# Patient Record
Sex: MALE | Race: WHITE | NOT HISPANIC OR LATINO | Employment: FULL TIME | URBAN - METROPOLITAN AREA
[De-identification: names, ages, dates, MRNs, and addresses within clinical notes are randomized per-mention and may not be internally consistent; named-entity substitution may affect disease eponyms.]

---

## 2018-02-20 ENCOUNTER — OFFICE VISIT CONVERTED (OUTPATIENT)
Dept: FAMILY MEDICINE CLINIC | Facility: CLINIC | Age: 23
End: 2018-02-20
Attending: FAMILY MEDICINE

## 2018-02-20 ENCOUNTER — CONVERSION ENCOUNTER (OUTPATIENT)
Dept: FAMILY MEDICINE CLINIC | Facility: CLINIC | Age: 23
End: 2018-02-20

## 2019-01-24 ENCOUNTER — HOSPITAL ENCOUNTER (OUTPATIENT)
Dept: FAMILY MEDICINE CLINIC | Facility: CLINIC | Age: 24
Discharge: HOME | End: 2019-01-24
Attending: FAMILY MEDICINE

## 2019-01-24 ENCOUNTER — CONVERSION ENCOUNTER (OUTPATIENT)
Dept: FAMILY MEDICINE CLINIC | Facility: CLINIC | Age: 24
End: 2019-01-24

## 2019-01-24 ENCOUNTER — OFFICE VISIT CONVERTED (OUTPATIENT)
Dept: FAMILY MEDICINE CLINIC | Facility: CLINIC | Age: 24
End: 2019-01-24
Attending: FAMILY MEDICINE

## 2019-01-24 LAB
C TRACH RRNA CVX QL NAA+PROBE: NOT DETECTED
N GONORRHOEA DNA SPEC QL NAA+PROBE: NOT DETECTED

## 2020-06-11 ENCOUNTER — OFFICE VISIT CONVERTED (OUTPATIENT)
Dept: FAMILY MEDICINE CLINIC | Facility: CLINIC | Age: 25
End: 2020-06-11
Attending: FAMILY MEDICINE

## 2020-12-17 ENCOUNTER — TELEMEDICINE CONVERTED (OUTPATIENT)
Dept: FAMILY MEDICINE CLINIC | Facility: CLINIC | Age: 25
End: 2020-12-17
Attending: FAMILY MEDICINE

## 2021-04-09 ENCOUNTER — HOSPITAL ENCOUNTER (OUTPATIENT)
Dept: URGENT CARE | Facility: CLINIC | Age: 26
Discharge: HOME | End: 2021-04-09
Attending: EMERGENCY MEDICINE

## 2021-04-09 LAB — SARS-COV-2 RNA SPEC QL NAA+PROBE: NOT DETECTED

## 2021-04-11 LAB — BACTERIA SPEC AEROBE CULT: NORMAL

## 2021-05-13 NOTE — PROGRESS NOTES
Progress Note      Patient Name: Jose Aquino   Patient ID: [id number]   Sex: Male   YOB: 1995    Primary Care Provider: Ko Shah DO    Visit Date: June 11, 2020    Provider: Ko Shah DO   Location: [facility]   Location Address: [address] [place] [address]  [id number]   Location Phone: [phone]          Chief Complaint  · Follow up - Insomnia  · medication refill      History Of Present Illness  Jose Aquino is a 24 year old /White male who presents for evaluation and treatment of: chronic insomnia. He has continued issues sleeping. He lays awake until 3-4 am. No daytime naps. He does caffeine early morning only. He limits his night time screen time.       Past Medical History  Disease Name Date Onset Notes   Concussion --  --    Knee: ACL --  --    MCL Sprain 04/14/2015 --    Sleep disorder, circadian 01/19/2017 --    Syncope and collapse 07/14/2017 --          Past Surgical History  Procedure Name Date Notes   Arthroscopic Knee Surgery 12/27/2013 and 07/28/2014 LEFT knee ACL recons         Medication List  Name Date Started Instructions   zolpidem 10 mg oral tablet  take 1 tablet (10 mg) by oral route once daily at bedtime         Allergy List  Allergen Name Date Reaction Notes   NO KNOWN DRUG ALLERGIES --  --  --          Family Medical History  Disease Name Relative/Age Notes   Hypertension Grandfather (maternal)/  Grandmother (maternal)/   --    *Unremarkable  --    Diabetes Grandfather (maternal)/  Grandmother (maternal)/   --          Social History  Finding Status Start/Stop Quantity Notes   Alcohol Current some day --/-- --  01/24/2019 -    Exercises regularly --  --/-- --  --    Tobacco Current some day --/-- --  vape         Review of Systems  · Constitutional  o Denies  o : fatigue  · Psychiatric  o Admits  o : difficulty sleeping      Vitals  Date Time BP Position Site L\R Cuff Size HR RR TEMP (F) WT  HT  BMI  "kg/m2 BSA m2 O2 Sat        02/20/2018 11:16 /74 Sitting    57 - R   329lbs 0oz 5'  11\" 45.89 2.73     01/24/2019 12:10 /68 Sitting    63 - R  99.2 313lbs 2oz 5'  11\" 43.67 2.67 100 %    06/11/2020 02:18 /78 Sitting    81 - R  98.5 219lbs 2oz 5'  11\" 30.56 2.23 97 %          Physical Examination  · Constitutional  o Appearance  o : well-nourished, well developed, no obvious deformities present  · Respiratory  o Respiratory Effort  o : breathing unlabored, no accessory muscle use  o Auscultation of Lungs  o : normal breath sounds  · Cardiovascular  o Heart  o :   § Auscultation of Heart  § : regular rate, normal rhythm, no murmurs present          Assessment  · Screening for depression     V79.0/Z13.89  · Sleep disorder, circadian     327.30/G47.20  Will continue the Zolpidem He will keep working on sleep hygiene      Plan  · Orders  o ACO-18: Positive screen for clinical depression using a standardized tool and a follow-up plan documented () - V79.0/Z13.89 - 06/11/2020  o ACO-39: Current medications updated and reviewed () - - 06/11/2020  · Medications  o zolpidem 10 mg oral tablet   SIG: take 1 tablet (10 mg) by oral route once daily at bedtime for 30 days   DISP: (30) tablet with 5 refills  Adjusted on 06/11/2020     o Medications have been Reconciled  o Transition of Care or Provider Policy  · Instructions  o Depression Screen completed and scanned into the EMR under the designated folder within the patient's documents.  o Today's PHQ-9 result is 8  o Patient is taking medications as prescribed and doing well.   o Take all medications as prescribed/directed.  o Patient instructed/educated on their diet and exercise program.  o Patient was educated/instructed on their diagnosis, treatment and medications prior to discharge from the clinic today.  o Patient instructed to seek medical attention urgently for new or worsening symptoms.  o Call the office with any concerns or " questions.  o Bring all medicines with their bottles to each office visit.  · Disposition  o Call or Return if symptoms worsen or persist.  o Return Visit Request in/on 6 months +/- 2 days ([id number]).            Electronically Signed by: Ko Shah DO -Author on June 11, 2020 03:14:46 PM

## 2021-05-14 NOTE — PROGRESS NOTES
Progress Note      Patient Name: Jose Aquino   Patient ID: [id number]   Sex: Male   YOB: 1995    Primary Care Provider: Ko Shah DO    Visit Date: December 17, 2020    Provider: Ko hSah DO   Location: [facility]   Location Address: [address] [place] [address]  [id number]   Location Phone: [phone]          Chief Complaint  · 6 month follow up - sleep disorder, cicadian   · pt is positive covid at home today       History Of Present Illness  Video Conferencing Visit  Jose Aquino is a 24 year old /White male who is presenting for evaluation via video conferencing via ZOOM. Verbal consent obtained before beginning visit.   The following staff were present during this visit: Angelica Mahan CMA   Jose Aquino is a 24 year old /White male who presents for evaluation and treatment of: covid. He was Dx a week ago with COVID. He had loss of smell. It has since resolved and currently feels fine.      Sleep disorder. He uses the Zolpidem 3-4 times per week. He typically gets about 5 hrs sleep per night.       Past Medical History  Disease Name Date Onset Notes   Concussion --  --    Knee: ACL --  --    MCL Sprain 04/14/2015 --    Sleep disorder, circadian 01/19/2017 --    Syncope and collapse 07/14/2017 --          Past Surgical History  Procedure Name Date Notes   Arthroscopic Knee Surgery 12/27/2013 and 07/28/2014 LEFT knee ACL recons         Medication List  Name Date Started Instructions   zolpidem 10 mg oral tablet 07/27/2020 TAKE 1 TABLET BY MOUTH EVERY DAY AT BEDTIME         Allergy List  Allergen Name Date Reaction Notes   NO KNOWN DRUG ALLERGIES --  --  --          Family Medical History  Disease Name Relative/Age Notes   Hypertension Grandfather (maternal)/  Grandmother (maternal)/   --    *Unremarkable  --    Diabetes Grandfather (maternal)/  Grandmother (maternal)/   --          Social  "History  Finding Status Start/Stop Quantity Notes   Alcohol Current some day --/-- --  01/24/2019 -    Exercises regularly --  --/-- --  --    Tobacco Current some day --/-- --  vape         Review of Systems  · Constitutional  o Denies  o : fatigue, fever, chills  · HENT  o Denies  o : headaches, nasal congestion, nasal discharge, postnasal drip  · Respiratory  o Denies  o : shortness of breath, wheezing, cough, dyspnea on exertion  · Gastrointestinal  o Denies  o : nausea, vomiting, diarrhea  · Psychiatric  o Admits  o : difficulty sleeping  o Denies  o : anxiety, depression      Vitals  Date Time BP Position Site L\R Cuff Size HR RR TEMP (F) WT  HT  BMI kg/m2 BSA m2 O2 Sat FR L/min FiO2 HC       02/20/2018 11:16 /74 Sitting    57 - R   329lbs 0oz 5'  11\" 45.89 2.73       01/24/2019 12:10 /68 Sitting    63 - R  99.2 313lbs 2oz 5'  11\" 43.67 2.67 100 %      06/11/2020 02:18 /78 Sitting    81 - R  98.5 219lbs 2oz 5'  11\" 30.56 2.23 97 %  21%          Physical Examination  · Constitutional  o Appearance  o : well-nourished, well developed, no obvious deformities present  · Head and Face  o Head  o :   § Inspection  § : atraumatic, normocephalic  o Face  o :   § Inspection  § : no facial lesions  · Respiratory  o Respiratory Effort  o : breathing unlabored, no accessory muscle use  · Psychiatric  o Judgement and Insight  o : judgement and insight intact  o Thought Processes  o : rate of thoughts normal, thought content logical, abstract reasoning within normal limits  o Mood and Affect  o : mood normal, affect appropriate          Assessment  · Sleep disorder, circadian     327.30/G47.20  stable  · COVID-19 virus detected       COVID-19     519.8/U07.1  his symptoms were short lived and have resolved.       Plan  · Orders  o ACO-39: Current medications updated and reviewed (1159F, ) - - 12/17/2020  · Medications  o zolpidem 10 mg oral tablet   SIG: take 1 tablet (10 mg) by oral route once daily " at bedtime for 30 days   DISP: (30) Tablet with 2 refills  Adjusted on 12/17/2020     o Medications have been Reconciled  o Transition of Care or Provider Policy  · Instructions  o Patient is taking medications as prescribed and doing well.   o Take all medications as prescribed/directed.  o Patient was educated/instructed on their diagnosis, treatment and medications prior to discharge from the clinic today.  o Patient instructed to seek medical attention urgently for new or worsening symptoms.  o Call the office with any concerns or questions.  o Bring all medicines with their bottles to each office visit.  · Disposition  o Call or Return if symptoms worsen or persist.  o Return Visit Request in/on 6 months +/- 2 days ([id number]).            Electronically Signed by: Ko Shah DO -Author on December 17, 2020 08:37:40 AM

## 2021-05-15 VITALS
HEART RATE: 81 BPM | OXYGEN SATURATION: 97 % | TEMPERATURE: 98.5 F | DIASTOLIC BLOOD PRESSURE: 78 MMHG | WEIGHT: 219.12 LBS | BODY MASS INDEX: 30.68 KG/M2 | HEIGHT: 71 IN | SYSTOLIC BLOOD PRESSURE: 136 MMHG

## 2021-05-16 VITALS
TEMPERATURE: 99.2 F | BODY MASS INDEX: 43.84 KG/M2 | WEIGHT: 313.12 LBS | DIASTOLIC BLOOD PRESSURE: 68 MMHG | HEART RATE: 63 BPM | HEIGHT: 71 IN | OXYGEN SATURATION: 100 % | SYSTOLIC BLOOD PRESSURE: 136 MMHG

## 2021-05-16 VITALS
BODY MASS INDEX: 44.1 KG/M2 | SYSTOLIC BLOOD PRESSURE: 129 MMHG | WEIGHT: 315 LBS | DIASTOLIC BLOOD PRESSURE: 74 MMHG | HEART RATE: 57 BPM | HEIGHT: 71 IN

## 2021-07-12 RX ORDER — ZOLPIDEM TARTRATE 10 MG/1
TABLET ORAL
Qty: 30 TABLET | OUTPATIENT
Start: 2021-07-12

## 2021-07-22 ENCOUNTER — OFFICE VISIT (OUTPATIENT)
Dept: FAMILY MEDICINE CLINIC | Facility: CLINIC | Age: 26
End: 2021-07-22

## 2021-07-22 VITALS
TEMPERATURE: 98.1 F | WEIGHT: 249 LBS | OXYGEN SATURATION: 99 % | BODY MASS INDEX: 34.86 KG/M2 | SYSTOLIC BLOOD PRESSURE: 117 MMHG | HEIGHT: 71 IN | DIASTOLIC BLOOD PRESSURE: 73 MMHG | HEART RATE: 66 BPM

## 2021-07-22 DIAGNOSIS — G47.20 SLEEP DISORDER, CIRCADIAN: Primary | ICD-10-CM

## 2021-07-22 LAB
AMPHET+METHAMPHET UR QL: POSITIVE
BARBITURATES UR QL SCN: NEGATIVE
BENZODIAZ UR QL SCN: NEGATIVE
CANNABINOIDS SERPL QL: POSITIVE
COCAINE UR QL: NEGATIVE
METHADONE UR QL SCN: NEGATIVE
OPIATES UR QL: NEGATIVE
OXYCODONE UR QL SCN: POSITIVE

## 2021-07-22 PROCEDURE — 80307 DRUG TEST PRSMV CHEM ANLYZR: CPT | Performed by: FAMILY MEDICINE

## 2021-07-22 PROCEDURE — 99213 OFFICE O/P EST LOW 20 MIN: CPT | Performed by: FAMILY MEDICINE

## 2021-07-22 RX ORDER — ZOLPIDEM TARTRATE 10 MG/1
1 TABLET ORAL DAILY
COMMUNITY
Start: 2021-04-24 | End: 2021-07-22 | Stop reason: SDUPTHER

## 2021-07-22 RX ORDER — ZOLPIDEM TARTRATE 10 MG/1
10 TABLET ORAL DAILY
Qty: 30 TABLET | Refills: 3 | Status: SHIPPED | OUTPATIENT
Start: 2021-07-22 | End: 2021-08-21

## 2021-07-22 NOTE — PROGRESS NOTES
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    Sleep disorder-his sleep cycle is still off.  He goes to bed at 7:30 PM to awaken at 3:30 AM.  Thus he uses a sleep aid to get the bed at an unusual time.  He feels only a little tired upon awakening but then that quickly resolves.      PHQ-2 Depression Screening  Little interest or pleasure in doing things? 0   Feeling down, depressed, or hopeless? 0   PHQ-2 Total Score 0   PHQ-9 Depression Screening  Little interest or pleasure in doing things? 0   Feeling down, depressed, or hopeless? 0   Trouble falling or staying asleep, or sleeping too much?     Feeling tired or having little energy?     Poor appetite or overeating?     Feeling bad about yourself - or that you are a failure or have let yourself or your family down?     Trouble concentrating on things, such as reading the newspaper or watching television?     Moving or speaking so slowly that other people could have noticed? Or the opposite - being so fidgety or restless that you have been moving around a lot more than usual?     Thoughts that you would be better off dead, or of hurting yourself in some way?     PHQ-9 Total Score 0   If you checked off any problems, how difficult have these problems made it for you to do your work, take care of things at home, or get along with other people?       No Known Allergies    Prior to Admission medications    Medication Sig Start Date End Date Taking? Authorizing Provider   zolpidem (AMBIEN) 10 MG tablet Take 1 tablet by mouth Daily. 4/24/21  Yes Provider, Jack, MD        Patient Active Problem List   Diagnosis   • Sleep disorder, circadian        Past Surgical  "History:   Procedure Laterality Date   • KNEE ARTHROSCOPY Left     12/27/13 and 7/28/14  ACL recons       Social History     Socioeconomic History   • Marital status: Single     Spouse name: Not on file   • Number of children: Not on file   • Years of education: Not on file   • Highest education level: Not on file   Tobacco Use   • Smoking status: Never Smoker   • Smokeless tobacco: Never Used   • Tobacco comment: current some day - vape   Vaping Use   • Vaping Use: Every day   • Substances: Nicotine, Flavoring   • Devices: Refillable tank   Substance and Sexual Activity   • Alcohol use: Yes     Comment: current some day -1/24/19   • Drug use: Never   • Sexual activity: Defer       Family History   Problem Relation Age of Onset   • Hypertension Maternal Grandmother    • Diabetes Maternal Grandmother    • Hypertension Maternal Grandfather    • Diabetes Maternal Grandfather        Family history, surgical history, past medical history, Allergies and med's reviewed with patient today and updated in [facility] EMR.     ROS:  Review of Systems   Constitutional: Negative for fatigue.   Respiratory: Negative for cough, chest tightness, shortness of breath and wheezing.    Cardiovascular: Negative for chest pain and palpitations.   Psychiatric/Behavioral: Negative for sleep disturbance.       OBJECTIVE:  Vitals:    07/22/21 1615   BP: 117/73   BP Location: Left arm   Patient Position: Sitting   Cuff Size: Large Adult   Pulse: 66   Temp: 98.1 °F (36.7 °C)   TempSrc: Temporal   SpO2: 99%   Weight: 113 kg (249 lb)   Height: 180.3 cm (71\")     No exam data present   Body mass index is 34.73 kg/m².  No LMP for male patient.    Physical Exam  Vitals and nursing note reviewed.   Constitutional:       General: He is not in acute distress.     Appearance: Normal appearance. He is obese.   HENT:      Head: Normocephalic and atraumatic.   Cardiovascular:      Rate and Rhythm: Normal rate and regular rhythm.      Pulses: Normal pulses.      " Heart sounds: Normal heart sounds. No murmur heard.     Pulmonary:      Effort: Pulmonary effort is normal.      Breath sounds: Normal breath sounds. No wheezing, rhonchi or rales.   Neurological:      Mental Status: He is alert.   Psychiatric:         Mood and Affect: Mood normal.         Procedures    No visits with results within 30 Day(s) from this visit.   Latest known visit with results is:   No results found for any previous visit.       ASSESSMENT/ PLAN:    Diagnoses and all orders for this visit:    1. Sleep disorder, circadian (Primary)  Assessment & Plan:  Due to his strange sleep-wake cycle he takes the zolpidem on an almost nightly basis.  He is not having any ill effects and allows him to get adequate rest to perform his job during the day.    Orders:  -     zolpidem (AMBIEN) 10 MG tablet; Take 1 tablet by mouth Daily for 30 days.  Dispense: 30 tablet; Refill: 3  -     Urine Drug Screen - Urine, Clean Catch      Orders Placed Today:     New Medications Ordered This Visit   Medications   • zolpidem (AMBIEN) 10 MG tablet     Sig: Take 1 tablet by mouth Daily for 30 days.     Dispense:  30 tablet     Refill:  3        Management Plan:     An After Visit Summary was printed and given to the patient at discharge.    Follow-up: No follow-ups on file.    Ko Shah,  7/22/2021 16:53 EDT  This note was electronically signed.

## 2021-07-22 NOTE — ASSESSMENT & PLAN NOTE
Due to his strange sleep-wake cycle he takes the zolpidem on an almost nightly basis.  He is not having any ill effects and allows him to get adequate rest to perform his job during the day.

## 2021-07-29 ENCOUNTER — TELEPHONE (OUTPATIENT)
Dept: FAMILY MEDICINE CLINIC | Facility: CLINIC | Age: 26
End: 2021-07-29

## 2021-07-29 DIAGNOSIS — R82.5 POSITIVE URINE DRUG SCREEN: Primary | ICD-10-CM

## 2021-08-09 NOTE — TELEPHONE ENCOUNTER
Notified patient, UDS conformation ordered, patient states he cannot come till Thursday but will be here Thursday morning.

## 2021-08-09 NOTE — TELEPHONE ENCOUNTER
I do not want this done as an in-house as it never picks up anything.  When to make sure that we send this out as a confirmation from the start.

## 2021-08-25 ENCOUNTER — OFFICE VISIT (OUTPATIENT)
Dept: FAMILY MEDICINE CLINIC | Facility: CLINIC | Age: 26
End: 2021-08-25

## 2021-08-25 VITALS
HEART RATE: 63 BPM | HEIGHT: 71 IN | DIASTOLIC BLOOD PRESSURE: 84 MMHG | WEIGHT: 246.5 LBS | BODY MASS INDEX: 34.51 KG/M2 | SYSTOLIC BLOOD PRESSURE: 137 MMHG | OXYGEN SATURATION: 99 %

## 2021-08-25 DIAGNOSIS — S29.019A STRAIN OF THORACIC SPINE, INITIAL ENCOUNTER: ICD-10-CM

## 2021-08-25 DIAGNOSIS — G47.20 SLEEP DISORDER, CIRCADIAN: Primary | ICD-10-CM

## 2021-08-25 PROCEDURE — 99214 OFFICE O/P EST MOD 30 MIN: CPT | Performed by: FAMILY MEDICINE

## 2021-08-25 RX ORDER — CYCLOBENZAPRINE HCL 5 MG
5 TABLET ORAL 3 TIMES DAILY PRN
Qty: 20 TABLET | Refills: 0 | Status: SHIPPED | OUTPATIENT
Start: 2021-08-25 | End: 2021-08-30

## 2021-08-25 RX ORDER — NAPROXEN 500 MG/1
500 TABLET ORAL 2 TIMES DAILY WITH MEALS
Qty: 60 TABLET | Refills: 1 | Status: SHIPPED | OUTPATIENT
Start: 2021-08-25 | End: 2021-09-24

## 2021-08-25 RX ORDER — ZOLPIDEM TARTRATE 5 MG/1
5 TABLET ORAL NIGHTLY PRN
COMMUNITY
End: 2022-01-05 | Stop reason: SDUPTHER

## 2021-08-25 NOTE — ASSESSMENT & PLAN NOTE
He takes the Ambien med as needed.  Typically when he has gone a couple days without sleep.  We will update his urine drug screen here today.

## 2021-08-25 NOTE — ASSESSMENT & PLAN NOTE
Would continue her routine course of anti-inflammatories we will add a short course of muscle relaxer and start some physical therapy.

## 2021-08-25 NOTE — PROGRESS NOTES
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    Back pain-he was originally seen in the emergency room in April of this year for mid to lower back pain.  He had x-rays at that time which were essentially normal.  He states he did get better at that time but in the last month it has gotten somewhat worse.  He states he is not aware of anything that makes it better or worse.  He states some days he will have several days in a row that he feels pretty good then followed by several days that it moderate severity on a daily basis.  There are no day-to-day activities that seem to aggravate it.  He was tried multiple over-the-counter anti-inflammatories with some benefit.      PHQ-2 Depression Screening  Little interest or pleasure in doing things?     Feeling down, depressed, or hopeless?     PHQ-2 Total Score     PHQ-9 Depression Screening  Little interest or pleasure in doing things?     Feeling down, depressed, or hopeless?     Trouble falling or staying asleep, or sleeping too much?     Feeling tired or having little energy?     Poor appetite or overeating?     Feeling bad about yourself - or that you are a failure or have let yourself or your family down?     Trouble concentrating on things, such as reading the newspaper or watching television?     Moving or speaking so slowly that other people could have noticed? Or the opposite - being so fidgety or restless that you have been moving around a lot more than usual?     Thoughts that you would be better off dead, or of hurting yourself in some way?     PHQ-9 Total Score     If you checked off any problems, how difficult have these  "problems made it for you to do your work, take care of things at home, or get along with other people?       No Known Allergies    Prior to Admission medications    Medication Sig Start Date End Date Taking? Authorizing Provider   zolpidem (AMBIEN) 5 MG tablet Take 5 mg by mouth At Night As Needed for Sleep.   Yes Provider, Historical, MD        Patient Active Problem List   Diagnosis   • Sleep disorder, circadian   • Strain of thoracic spine, initial encounter        Past Surgical History:   Procedure Laterality Date   • KNEE ARTHROSCOPY Left     12/27/13 and 7/28/14  ACL recons       Social History     Socioeconomic History   • Marital status: Single     Spouse name: Not on file   • Number of children: Not on file   • Years of education: Not on file   • Highest education level: Not on file   Tobacco Use   • Smoking status: Never Smoker   • Smokeless tobacco: Never Used   • Tobacco comment: current some day - vape   Vaping Use   • Vaping Use: Every day   • Substances: Nicotine, Flavoring   • Devices: Refillable tank   Substance and Sexual Activity   • Alcohol use: Yes     Comment: current some day -1/24/19   • Drug use: Never   • Sexual activity: Defer       Family History   Problem Relation Age of Onset   • Hypertension Maternal Grandmother    • Diabetes Maternal Grandmother    • Hypertension Maternal Grandfather    • Diabetes Maternal Grandfather        Family history, surgical history, past medical history, Allergies and med's reviewed with patient today and updated in [facility] EMR.     ROS:  Review of Systems   Constitutional: Negative for fatigue.   Musculoskeletal: Positive for back pain.       OBJECTIVE:  Vitals:    08/25/21 1540   BP: 137/84   BP Location: Right arm   Patient Position: Sitting   Cuff Size: Large Adult   Pulse: 63   SpO2: 99%   Weight: 112 kg (246 lb 8 oz)   Height: 180.3 cm (71\")     No exam data present   Body mass index is 34.38 kg/m².  No LMP for male patient.    Physical Exam  Vitals and " nursing note reviewed.   Constitutional:       General: He is not in acute distress.     Appearance: Normal appearance. He is obese.   Musculoskeletal:      Thoracic back: Tenderness present. No swelling or deformity. No scoliosis.        Back:       Comments: T8-T10   Neurological:      Mental Status: He is alert.         Procedures    No visits with results within 30 Day(s) from this visit.   Latest known visit with results is:   Office Visit on 07/22/2021   Component Date Value Ref Range Status   • Amphet/Methamphet, Screen 07/22/2021 Positive* Negative Final   • Barbiturates Screen, Urine 07/22/2021 Negative  Negative Final   • Benzodiazepine Screen, Urine 07/22/2021 Negative  Negative Final   • Cocaine Screen, Urine 07/22/2021 Negative  Negative Final   • Opiate Screen 07/22/2021 Negative  Negative Final   • THC, Screen, Urine 07/22/2021 Positive* Negative Final   • Methadone Screen, Urine 07/22/2021 Negative  Negative Final   • Oxycodone Screen, Urine 07/22/2021 Positive* Negative Final       ASSESSMENT/ PLAN:    Diagnoses and all orders for this visit:    1. Sleep disorder, circadian (Primary)  Assessment & Plan:  He takes the Ambien med as needed.  Typically when he has gone a couple days without sleep.  We will update his urine drug screen here today.    Orders:  -     Urine Drug Screen Confirmation - Urine, Clean Catch    2. Strain of thoracic spine, initial encounter  Assessment & Plan:  Would continue her routine course of anti-inflammatories we will add a short course of muscle relaxer and start some physical therapy.    Orders:  -     Ambulatory Referral to Physical Therapy    Other orders  -     naproxen (Naprosyn) 500 MG tablet; Take 1 tablet by mouth 2 (Two) Times a Day With Meals for 30 days.  Dispense: 60 tablet; Refill: 1  -     cyclobenzaprine (FLEXERIL) 5 MG tablet; Take 1 tablet by mouth 3 (Three) Times a Day As Needed for Muscle Spasms for up to 5 days.  Dispense: 20 tablet; Refill:  0      Orders Placed Today:     New Medications Ordered This Visit   Medications   • naproxen (Naprosyn) 500 MG tablet     Sig: Take 1 tablet by mouth 2 (Two) Times a Day With Meals for 30 days.     Dispense:  60 tablet     Refill:  1   • cyclobenzaprine (FLEXERIL) 5 MG tablet     Sig: Take 1 tablet by mouth 3 (Three) Times a Day As Needed for Muscle Spasms for up to 5 days.     Dispense:  20 tablet     Refill:  0        Management Plan:     An After Visit Summary was printed and given to the patient at discharge.    Follow-up: Return in about 6 weeks (around 10/6/2021).    Ko Shah DO 8/25/2021 16:21 EDT  This note was electronically signed.

## 2021-09-17 NOTE — TELEPHONE ENCOUNTER
Caller: DebiJose Laws    Relationship: Self    Best call back number: [phone]     Medication needed:   Requested Prescriptions     Pending Prescriptions Disp Refills   • cyclobenzaprine (FLEXERIL) 5 MG tablet 20 tablet 0     Sig: Take 1 tablet by mouth 3 (Three) Times a Day As Needed for Muscle Spasms for up to 5 days.       When do you need the refill by: ASAP    What additional details did the patient provide when requesting the medication: PATIENT IS OUT OF MEDICATION     Does the patient have less than a 3 day supply:  [] Yes  [] No    What is the patient's preferred pharmacy: [id number] [place] - [address] AT [facility] [phone] [facility] [phone] [place]     PATIENT REQUESTS CALL BACK WHEN PRESCRIPTIONS HAVE BEEN SENT

## 2021-09-20 RX ORDER — CYCLOBENZAPRINE HCL 5 MG
TABLET ORAL
Qty: 20 TABLET | Refills: 0 | OUTPATIENT
Start: 2021-09-20

## 2021-09-20 RX ORDER — CYCLOBENZAPRINE HCL 5 MG
5 TABLET ORAL 3 TIMES DAILY PRN
Qty: 20 TABLET | Refills: 0 | OUTPATIENT
Start: 2021-09-20 | End: 2021-09-25

## 2021-09-23 ENCOUNTER — OFFICE VISIT (OUTPATIENT)
Dept: FAMILY MEDICINE CLINIC | Facility: CLINIC | Age: 26
End: 2021-09-23

## 2021-09-23 VITALS
HEART RATE: 105 BPM | HEIGHT: 71 IN | DIASTOLIC BLOOD PRESSURE: 80 MMHG | WEIGHT: 245.2 LBS | TEMPERATURE: 98 F | OXYGEN SATURATION: 100 % | BODY MASS INDEX: 34.33 KG/M2 | SYSTOLIC BLOOD PRESSURE: 139 MMHG

## 2021-09-23 DIAGNOSIS — S29.019A STRAIN OF THORACIC SPINE, INITIAL ENCOUNTER: Primary | ICD-10-CM

## 2021-09-23 PROCEDURE — 99213 OFFICE O/P EST LOW 20 MIN: CPT | Performed by: FAMILY MEDICINE

## 2021-09-23 RX ORDER — CYCLOBENZAPRINE HCL 5 MG
5 TABLET ORAL 3 TIMES DAILY PRN
COMMUNITY
End: 2021-09-23 | Stop reason: SDUPTHER

## 2021-09-23 RX ORDER — CYCLOBENZAPRINE HCL 5 MG
5 TABLET ORAL 3 TIMES DAILY PRN
Qty: 60 TABLET | Refills: 1 | Status: SHIPPED | OUTPATIENT
Start: 2021-09-23 | End: 2021-10-23

## 2021-09-23 NOTE — PROGRESS NOTES
Chief Complaint   Patient presents with   • Med Refill     cyclobenzaprine        Subjective     Jose Forbear Scott  has a past medical history of Chronic rupture of ACL of left knee, Concussion, COVID-19 (12/17/2020), MCL sprain of left knee (04/14/2015), Sleep disorder, circadian (01/19/2017), and Syncope and collapse (07/14/2017).    Back strain-he states his back is somewhat better.  The Naprosyn does help, but by the end of the day he noticed a little bit more stiffness in his mid to upper back.  He states the Flexeril does seem to help when he takes it.  And when taking it though he states he does not feel sleepy dopey or groggy.      PHQ-2 Depression Screening  Little interest or pleasure in doing things?     Feeling down, depressed, or hopeless?     PHQ-2 Total Score     PHQ-9 Depression Screening  Little interest or pleasure in doing things?     Feeling down, depressed, or hopeless?     Trouble falling or staying asleep, or sleeping too much?     Feeling tired or having little energy?     Poor appetite or overeating?     Feeling bad about yourself - or that you are a failure or have let yourself or your family down?     Trouble concentrating on things, such as reading the newspaper or watching television?     Moving or speaking so slowly that other people could have noticed? Or the opposite - being so fidgety or restless that you have been moving around a lot more than usual?     Thoughts that you would be better off dead, or of hurting yourself in some way?     PHQ-9 Total Score     If you checked off any problems, how difficult have these problems made it for you to do your work, take care of things at home, or get along with other people?       No Known Allergies    Prior to Admission medications    Medication Sig Start Date End Date Taking? Authorizing Provider   cyclobenzaprine (FLEXERIL) 5 MG tablet Take 5 mg by mouth 3 (Three) Times a Day As Needed for Muscle Spasms.   Yes Provider, MD Jack  "  naproxen (Naprosyn) 500 MG tablet Take 1 tablet by mouth 2 (Two) Times a Day With Meals for 30 days. 21 Yes Ko Shah,    zolpidem (AMBIEN) 5 MG tablet Take 5 mg by mouth At Night As Needed for Sleep.   Yes Provider, Jack, MD        Patient Active Problem List   Diagnosis   • Sleep disorder, circadian   • Strain of thoracic spine, initial encounter        Past Surgical History:   Procedure Laterality Date   • KNEE ARTHROSCOPY Left     15 and 16  ACL recons       Social History     Socioeconomic History   • Marital status: Single     Spouse name: Not on file   • Number of children: Not on file   • Years of education: Not on file   • Highest education level: Not on file   Tobacco Use   • Smoking status: Former Smoker     Packs/day: 1.00     Types: Cigarettes     Quit date:      Years since quittin.7   • Smokeless tobacco: Never Used   • Tobacco comment: current some day - vape   Vaping Use   • Vaping Use: Every day   • Substances: Nicotine, Flavoring   • Devices: Refillable tank   Substance and Sexual Activity   • Alcohol use: Yes     Comment: current some day -19   • Drug use: Never   • Sexual activity: Defer       Family History   Problem Relation Age of Onset   • Hypertension Maternal Grandmother    • Diabetes Maternal Grandmother    • Hypertension Maternal Grandfather    • Diabetes Maternal Grandfather        Family history, surgical history, past medical history, Allergies and med's reviewed with patient today and updated in [employer] EMR.     ROS:  Review of Systems   Constitutional: Negative for fatigue.   Musculoskeletal: Positive for arthralgias, back pain and myalgias.       OBJECTIVE:  Vitals:    21 1607   BP: 139/80   BP Location: Left arm   Patient Position: Sitting   Pulse: 105   Temp: 98 °F (36.7 °C)   SpO2: 100%   Weight: 111 kg (245 lb 3.2 oz)   Height: 180.3 cm (71\")     No exam data present   Body mass index is 34.2 kg/m².  No LMP for " male patient.    Physical Exam  Vitals and nursing note reviewed.   Constitutional:       Appearance: Normal appearance. He is obese.   HENT:      Head: Normocephalic and atraumatic.   Musculoskeletal:      Thoracic back: Tenderness present. No swelling or lacerations.        Back:    Neurological:      Mental Status: He is alert.         Procedures    No visits with results within 30 Day(s) from this visit.   Latest known visit with results is:   Office Visit on 07/22/2021   Component Date Value Ref Range Status   • Amphet/Methamphet, Screen 07/22/2021 Positive* Negative Final   • Barbiturates Screen, Urine 07/22/2021 Negative  Negative Final   • Benzodiazepine Screen, Urine 07/22/2021 Negative  Negative Final   • Cocaine Screen, Urine 07/22/2021 Negative  Negative Final   • Opiate Screen 07/22/2021 Negative  Negative Final   • THC, Screen, Urine 07/22/2021 Positive* Negative Final   • Methadone Screen, Urine 07/22/2021 Negative  Negative Final   • Oxycodone Screen, Urine 07/22/2021 Positive* Negative Final       ASSESSMENT/ PLAN:    Diagnoses and all orders for this visit:    1. Strain of thoracic spine, initial encounter (Primary)  Assessment & Plan:  This is somewhat better but persistent or recurrent.  He needs to work on stretching and also strengthening in the muscles of his mid upper back.  Who continues the Naprosyn twice daily and will refill the muscle relaxer to use as needed.        Orders Placed Today:     No orders of the defined types were placed in this encounter.       Management Plan:     An After Visit Summary was printed and given to the patient at discharge.    Follow-up: Return if symptoms worsen or fail to improve.    Ko Shah DO 9/23/2021 16:51 EDT  This note was electronically signed.

## 2021-09-23 NOTE — ASSESSMENT & PLAN NOTE
This is somewhat better but persistent or recurrent.  He needs to work on stretching and also strengthening in the muscles of his mid upper back.  Who continues the Naprosyn twice daily and will refill the muscle relaxer to use as needed.

## 2021-09-28 ENCOUNTER — TREATMENT (OUTPATIENT)
Dept: PHYSICAL THERAPY | Facility: CLINIC | Age: 26
End: 2021-09-28

## 2021-09-28 DIAGNOSIS — M54.6 THORACIC BACK PAIN, UNSPECIFIED BACK PAIN LATERALITY, UNSPECIFIED CHRONICITY: ICD-10-CM

## 2021-09-28 DIAGNOSIS — S29.012A STRAIN OF THORACIC BACK REGION: Primary | ICD-10-CM

## 2021-09-28 PROCEDURE — 97014 ELECTRIC STIMULATION THERAPY: CPT | Performed by: PHYSICAL THERAPIST

## 2021-09-28 PROCEDURE — 97140 MANUAL THERAPY 1/> REGIONS: CPT | Performed by: PHYSICAL THERAPIST

## 2021-09-28 PROCEDURE — 97161 PT EVAL LOW COMPLEX 20 MIN: CPT | Performed by: PHYSICAL THERAPIST

## 2021-09-28 NOTE — PROGRESS NOTES
Physical Therapy Initial Evaluation and Plan of Care    Patient: Jose Chavez   : 1995  Diagnosis/ICD-10 Code:  Strain of thoracic back region [S29.012A]  Referring practitioner: Ko Shah,*  Date of Initial Visit: 2021  Today's Date: 2021  Patient seen for 1 sessions           Subjective Questionnaire: Oswestry: 20%      Subjective Evaluation    History of Present Illness  Mechanism of injury: Pt reporting increased mid back pain of unknown origin starting around April this year. X-rays at the time were clear. Pain is fine in the morning, but worsens by the end of the day. 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 Ice and rest seems to help, exercises make it worse.       Patient Occupation:  Pain  Current pain ratin  At best pain ratin  At worst pain rating: 10  Quality: tight, cramping, discomfort and knife-like  Relieving factors: ice and rest  Aggravating factors: repetitive movement and lifting    Diagnostic Tests  X-ray: normal    Treatments  Previous treatment: medication  Patient Goals  Patient goals for therapy: decreased pain, increased motion, increased strength and return to sport/leisure activities             Objective          Postural Observations  Seated posture: fair  Standing posture: fair    Additional Postural Observation Details  Increased thoracic kyphosis lower thoracic    Palpation   Left   Muscle spasm in the cervical paraspinals.   Tenderness of the cervical paraspinals.     Right   Muscle spasm in the cervical paraspinals. Tenderness of the cervical paraspinals.     Strength/Myotome Testing     Left Shoulder     Planes of Motion   Flexion: 4+   Extension: 4   Adduction: 4+   Horizontal abduction: 4-     Right Shoulder     Planes of Motion   Flexion: 4+   Extension: 4   Adduction: 4+   Horizontal abduction: 4-       See Exercise, Manual, and Modality Logs for  complete treatment.     Assessment & Plan     Assessment  Impairments: abnormal muscle firing, abnormal or restricted ROM, activity intolerance, impaired physical strength and pain with function  Assessment details: The patient presents to physical therapy with complaints of thoracic back pain and surrounding muscle pain and stiffness. The patient presents with associated postural weakness, thoracic stiffness, and functional deficits (OSWESTRY). The patient would benefit from skilled PT intervention to address the above mentioned functional limitations.     Prognosis: good  Functional Limitations: carrying objects, lifting, uncomfortable because of pain, sitting and standing  Goals  Plan Goals: MID BACK PROBLEMS:    1. The patient complains of mid back pain.  LTG 1: 12 weeks:  The patient will report a pain rating of 2/10 or better in order to improve  tolerance to activities of daily living and improve sleep quality.  STATUS:  New  STG 1a: 6 weeks:  The patient will report a pain rating of 4/10 or better.  STATUS:  New  TREATMENT:  Therapeutic exercises, manual therapy, aquatic therapy, home exercise   instruction, and modalities as needed for pain to include:  electrical stimulation, moist heat, ice,   ultrasound, and diathermy.    2. The patient has limited strength of the bilateral shoulder.   LTG 2: 12 weeks:  The patient will demonstrate 5 /5 strength for bilateral shoulder flexion, abduction, horizontal abduction, and extension in order to demonstrate improved shoulder stability.    STATUS:  New   STG 2a: 6 weeks:  The patient will demonstrate 4+ /5 strength for bilateral shoulder horizontal abduction, and extension    STATUS:  New   STG2b:  4 weeks:  The patient will be independent with home exercises.     STATUS:  New   TREATMENT: Manual therapy, therapeutic exercise, home exercise instruction, and modalities as needed to include: electrical stimulation, ultrasound, moist heat, and ice.      3. Mobility:  Walking/Moving Around Functional Limitation    LTG 12: 12 weeks:  The patient will demonstrate 1-19 % limitation by achieving a score of 0-9 on the HERVE.  STATUS:  New  STG 6 a: 6 weeks:  The patient will demonstrate 20-39 % limitation by achieving a score of 10-19 on the HERVE.    STATUS:  New  TREATMENT:  Manual therapy, therapeutic exercise, home exercise instruction, and modalities as needed to include: moist heat, electrical stimulation, and ultrasound.                 Plan  Therapy options: will be seen for skilled physical therapy services  Planned modality interventions: TENS, cryotherapy, thermotherapy (hydrocollator packs), traction and dry needling  Planned therapy interventions: manual therapy, stretching, strengthening, therapeutic activities, neuromuscular re-education, home exercise program, joint mobilization, functional ROM exercises, soft tissue mobilization, spinal/joint mobilization and flexibility  Frequency: 3x week  Duration in weeks: 12  Treatment plan discussed with: patient        Visit Diagnoses:    ICD-10-CM ICD-9-CM   1. Strain of thoracic back region  S29.012A 847.1   2. Thoracic back pain, unspecified back pain laterality, unspecified chronicity  M54.6 724.1       History # of Personal Factors and/or Comorbidities: LOW (0)  Examination of Body System(s): # of elements: LOW (1-2)  Clinical Presentation: STABLE   Clinical Decision Making: LOW       Timed:         Manual Therapy:    8     mins  48649;     Therapeutic Exercise:    0     mins  12321;     Neuromuscular Amelia:   0    mins  77820;    Therapeutic Activity:     0     mins  23239;     Gait Trainin     mins  42562;     Ultrasound:     0     mins  59069;    Ionto                               0    mins   73820  Self Care                       0     mins   52246  Canalith Repos    0     mins 40061      Un-Timed:  Electrical Stimulation:    15     mins  06999 ( );  Dry Needling     0     mins self-pay  Traction     0      mins 62068  Low Eval     20     Mins  13454  Mod Eval     0     Mins  29657  High Eval                       0     Mins  53815  Re-Eval                           0    mins  42393    Timed Treatment:   8   mins   Total Treatment:     43   mins    PT SIGNATURE: Adalberto Reyes, EJ         Initial Certification  Certification Period: 9/28/2021 thru 12/27/2021  I certify that the therapy services are furnished while this patient is under my care.  The services outlined above are required by this patient, and will be reviewed every 90 days.     PHYSICIAN: Ko Shah, DO      DATE:     Please sign and return via fax to [phone]. Thank you, [facility].

## 2021-10-01 ENCOUNTER — TREATMENT (OUTPATIENT)
Dept: PHYSICAL THERAPY | Facility: CLINIC | Age: 26
End: 2021-10-01

## 2021-10-01 DIAGNOSIS — M54.6 THORACIC BACK PAIN, UNSPECIFIED BACK PAIN LATERALITY, UNSPECIFIED CHRONICITY: ICD-10-CM

## 2021-10-01 DIAGNOSIS — S29.012A STRAIN OF THORACIC BACK REGION: Primary | ICD-10-CM

## 2021-10-01 PROCEDURE — 97140 MANUAL THERAPY 1/> REGIONS: CPT | Performed by: PHYSICAL THERAPIST

## 2021-10-01 PROCEDURE — 97110 THERAPEUTIC EXERCISES: CPT | Performed by: PHYSICAL THERAPIST

## 2021-10-01 PROCEDURE — 97014 ELECTRIC STIMULATION THERAPY: CPT | Performed by: PHYSICAL THERAPIST

## 2021-10-01 NOTE — PROGRESS NOTES
Physical Therapy Daily Progress Note    Patient: Jose Chavez   : 1995  Diagnosis/ICD-10 Code:  Strain of thoracic back region [S29.012A]  Referring practitioner: Ko Shah,*  Date of Initial Visit: Type: THERAPY  Noted: 2021  Today's Date: 10/1/2021  Patient seen for 2 sessions           Subjective Evaluation    History of Present Illness    Subjective comment: Pt reporting he felt more loose after the last visit. Pain  Current pain rating: 3           Objective   See Exercise, Manual, and Modality Logs for complete treatment.       Assessment & Plan     Assessment  Assessment details: Pt tolerated today's session well, reporting decreased pain at the end of the session. Pt would benefit from continued skilled therapy to address deficits. Progress per plan of care.                 Manual Therapy:    8     mins  63373;  Therapeutic Exercise:    16     mins  00088;     Neuromuscular Amelia:    0    mins  76849;    Therapeutic Activity:     0     mins  91404;     Gait Trainin     mins  58660;     Ultrasound:     0     mins  84587;    Electrical Stimulation:    15     mins  41226 ( );  Dry Needling     0     mins self-pay;  Aquatic Therapy    0     mins  13063;  Mechanical Traction    0     mins  83315  Moist Heat     0     mins  No charge    Timed Treatment:   24   mins   Total Treatment:     39   mins    Adalberto Reyes, PT  Physical Therapist

## 2021-10-14 ENCOUNTER — TREATMENT (OUTPATIENT)
Dept: PHYSICAL THERAPY | Facility: CLINIC | Age: 26
End: 2021-10-14

## 2021-10-14 DIAGNOSIS — S29.012A STRAIN OF THORACIC BACK REGION: Primary | ICD-10-CM

## 2021-10-14 DIAGNOSIS — M54.6 THORACIC BACK PAIN, UNSPECIFIED BACK PAIN LATERALITY, UNSPECIFIED CHRONICITY: ICD-10-CM

## 2021-10-14 PROCEDURE — 97110 THERAPEUTIC EXERCISES: CPT | Performed by: PHYSICAL THERAPIST

## 2021-10-14 PROCEDURE — 97140 MANUAL THERAPY 1/> REGIONS: CPT | Performed by: PHYSICAL THERAPIST

## 2021-10-14 PROCEDURE — 97014 ELECTRIC STIMULATION THERAPY: CPT | Performed by: PHYSICAL THERAPIST

## 2021-10-14 NOTE — PROGRESS NOTES
Physical Therapy Daily Progress Note    Patient: Jose Chavez   : 1995  Diagnosis/ICD-10 Code:  Strain of thoracic back region [S29.012A]  Referring practitioner: Ko Shah,*  Date of Initial Visit: Type: THERAPY  Noted: 2021  Today's Date: 10/14/2021  Patient seen for 3 sessions           Subjective Evaluation    History of Present Illness    Subjective comment: Pt reporting he didn't feel as sore after last visit as he had previously. Pain  Current pain ratin  Quality: tight           Objective   See Exercise, Manual, and Modality Logs for complete treatment.       Assessment & Plan     Assessment  Assessment details: Pt tolerated today's session well, pt continues to have tightness throughout the mid thoracic region.  Pt would benefit from continued skilled therapy to address deficits. Progress per plan of care.                 Manual Therapy:    10     mins  78439;  Therapeutic Exercise:    15     mins  52592;     Neuromuscular Amelia:    0    mins  92092;    Therapeutic Activity:     0     mins  48257;     Gait Trainin     mins  67966;     Ultrasound:     0     mins  06293;    Electrical Stimulation:    15     mins  65561 ( );  Dry Needling     0     mins self-pay;  Aquatic Therapy    0     mins  11256;  Mechanical Traction    0     mins  55035  Moist Heat     0     mins  No charge    Timed Treatment:   25   mins   Total Treatment:     40   mins    Adalberto Reyes, PT  Physical Therapist

## 2021-10-18 ENCOUNTER — TREATMENT (OUTPATIENT)
Dept: PHYSICAL THERAPY | Facility: CLINIC | Age: 26
End: 2021-10-18

## 2021-10-18 DIAGNOSIS — S29.012A STRAIN OF THORACIC BACK REGION: Primary | ICD-10-CM

## 2021-10-18 DIAGNOSIS — M54.6 THORACIC BACK PAIN, UNSPECIFIED BACK PAIN LATERALITY, UNSPECIFIED CHRONICITY: ICD-10-CM

## 2021-10-18 PROCEDURE — 97014 ELECTRIC STIMULATION THERAPY: CPT | Performed by: PHYSICAL THERAPIST

## 2021-10-18 PROCEDURE — 97110 THERAPEUTIC EXERCISES: CPT | Performed by: PHYSICAL THERAPIST

## 2021-10-18 PROCEDURE — 97140 MANUAL THERAPY 1/> REGIONS: CPT | Performed by: PHYSICAL THERAPIST

## 2021-10-18 NOTE — PROGRESS NOTES
Physical Therapy Daily Progress Note    Patient: Jose Chavez   : 1995  Diagnosis/ICD-10 Code:  Strain of thoracic back region [S29.012A]  Referring practitioner: Ko Shah,*  Date of Initial Visit: Type: THERAPY  Noted: 2021  Today's Date: 10/18/2021  Patient seen for 4 sessions           Subjective Evaluation    History of Present Illness    Subjective comment: Pt reporting he was sore following therapy, but he feels he is improving overall. Pain  Current pain rating: 3           Objective   See Exercise, Manual, and Modality Logs for complete treatment.       Assessment & Plan     Assessment  Assessment details: Pt tolerated today's session well. Pt would benefit from continued skilled therapy to address deficits. Progress per plan of care.                 Manual Therapy:    12     mins  71356;  Therapeutic Exercise:    15     mins  90257;     Neuromuscular Amelia:    0    mins  39839;    Therapeutic Activity:     0     mins  84458;     Gait Trainin     mins  87470;     Ultrasound:     0     mins  28648;    Electrical Stimulation:    15     mins  18834 ( );  Dry Needling     0     mins self-pay;  Aquatic Therapy    0     mins  46639;  Mechanical Traction    0     mins  11551  Moist Heat     0     mins  No charge    Timed Treatment:   27   mins   Total Treatment:     42   mins    Adalberto Reyes, PT  Physical Therapist

## 2021-10-21 ENCOUNTER — TREATMENT (OUTPATIENT)
Dept: PHYSICAL THERAPY | Facility: CLINIC | Age: 26
End: 2021-10-21

## 2021-10-21 DIAGNOSIS — S29.012A STRAIN OF THORACIC BACK REGION: Primary | ICD-10-CM

## 2021-10-21 DIAGNOSIS — M54.6 THORACIC BACK PAIN, UNSPECIFIED BACK PAIN LATERALITY, UNSPECIFIED CHRONICITY: ICD-10-CM

## 2021-10-21 PROCEDURE — 97110 THERAPEUTIC EXERCISES: CPT | Performed by: PHYSICAL THERAPIST

## 2021-10-21 PROCEDURE — 97140 MANUAL THERAPY 1/> REGIONS: CPT | Performed by: PHYSICAL THERAPIST

## 2021-10-21 PROCEDURE — 97014 ELECTRIC STIMULATION THERAPY: CPT | Performed by: PHYSICAL THERAPIST

## 2021-10-21 NOTE — PROGRESS NOTES
Physical Therapy Daily Progress Note    Patient: Jose Chavez   : 1995  Diagnosis/ICD-10 Code:  Strain of thoracic back region [S29.012A]  Referring practitioner: Ko Shah,*  Date of Initial Visit: Type: THERAPY  Noted: 2021  Today's Date: 10/21/2021  Patient seen for 5 sessions           Subjective Evaluation    History of Present Illness    Subjective comment: Pt reporting he tried going off medication for a day and still having alot of pain. Pain  Current pain ratin           Objective   See Exercise, Manual, and Modality Logs for complete treatment.       Assessment & Plan     Assessment  Assessment details: Pt tolerated today's session well. Pt would benefit from continued skilled therapy to address deficits. Progress per plan of care.                 Manual Therapy:    10     mins  09668;  Therapeutic Exercise:    15     mins  51404;     Neuromuscular Amelia:    0    mins  26762;    Therapeutic Activity:     0     mins  82606;     Gait Trainin     mins  24743;     Ultrasound:     0     mins  76212;    Electrical Stimulation:    15     mins  68144 ( );  Dry Needling     0     mins self-pay;  Aquatic Therapy    0     mins  90001;  Mechanical Traction    0     mins  75007  Moist Heat     0     mins  No charge    Timed Treatment:   25   mins   Total Treatment:     40   mins    Adalberto Reyes PT  Physical Therapist    Electronically signed 10/21/2021    [place] License: PT - [id number]

## 2021-10-29 ENCOUNTER — TELEPHONE (OUTPATIENT)
Dept: PHYSICAL THERAPY | Facility: CLINIC | Age: 26
End: 2021-10-29

## 2022-01-05 ENCOUNTER — TELEPHONE (OUTPATIENT)
Dept: FAMILY MEDICINE CLINIC | Facility: CLINIC | Age: 27
End: 2022-01-05

## 2022-01-05 RX ORDER — ZOLPIDEM TARTRATE 5 MG/1
5 TABLET ORAL NIGHTLY PRN
Qty: 30 TABLET | Refills: 0 | Status: SHIPPED | OUTPATIENT
Start: 2022-01-05 | End: 2022-01-12 | Stop reason: SDUPTHER

## 2022-01-05 NOTE — TELEPHONE ENCOUNTER
Caller: Jose Starr    Relationship: Self    Best call back number: [phone]     Requested Prescriptions: zolpidem (AMBIEN) 5 MG tablet  Requested Prescriptions      No prescriptions requested or ordered in this encounter        Pharmacy where request should be sent:      [id number] [place] - [address] [facility] AT [facility] - [phone] [facility] [phone]   [phone]    Additional details provided by patient: LESS THEN 3 DAYS LEFT    Does the patient have less than a 3 day supply:  [x] Yes  [] No    Cee Valentin Rep   01/05/22 12:17 EST

## 2022-01-11 ENCOUNTER — TELEPHONE (OUTPATIENT)
Dept: FAMILY MEDICINE CLINIC | Facility: CLINIC | Age: 27
End: 2022-01-11

## 2022-01-11 NOTE — TELEPHONE ENCOUNTER
Caller: Jose Starr    Relationship: Self    Best call back number: [phone]    What is the best time to reach you: ANY    Who are you requesting to speak with (clinical staff, provider,  specific staff member): CLINICAL    What was the call regarding: PATIENT STATES INCORRECT DOSAGE OF zolpidem (AMBIEN) 5 MG tablet  WAS SENT IN. HE GETS 10MG SENT IN NOT 5MG. HE IS REQUESTING NEW SCRIPT TO BE SENT IN.     IF DONE TODAY (1/11),  PLEASE SEND TO: [id number] [place] [facility] [address] [facility] AT [facility] - [phone] [facility] [phone]   [phone]    IF DONE AFTER TODAY, PLEASE SEND TO: [facility]  [place] [address] [facility] [address] (NOT IN LIST)      Do you require a callback: YES

## 2022-01-12 RX ORDER — ZOLPIDEM TARTRATE 10 MG/1
10 TABLET ORAL NIGHTLY PRN
Qty: 30 TABLET | Refills: 5 | Status: SHIPPED | OUTPATIENT
Start: 2022-01-12 | End: 2022-08-01 | Stop reason: SDUPTHER

## 2022-01-12 NOTE — TELEPHONE ENCOUNTER
Sent in new Rx for 10mg. Need to get CORRECT med, dosages, and instruction is patient charts please.

## 2022-01-24 PROBLEM — S06.0XAA CONCUSSION: Status: ACTIVE | Noted: 2022-01-24

## 2022-01-24 PROBLEM — R55 SYNCOPE AND COLLAPSE: Status: ACTIVE | Noted: 2017-07-14

## 2022-01-24 PROBLEM — S83.509A SPRAIN AND STRAIN OF CRUCIATE LIGAMENT OF KNEE: Status: ACTIVE | Noted: 2022-01-24

## 2022-02-02 RX ORDER — CYCLOBENZAPRINE HCL 5 MG
5 TABLET ORAL 3 TIMES DAILY PRN
Qty: 15 TABLET | Refills: 0 | Status: SHIPPED | OUTPATIENT
Start: 2022-02-02 | End: 2022-05-23

## 2022-02-02 NOTE — TELEPHONE ENCOUNTER
Caller: Jose Starr    Relationship: Self    Best call back number: [phone]     Requested Prescriptions:   Requested Prescriptions     Pending Prescriptions Disp Refills   • cyclobenzaprine (FLEXERIL) 5 MG tablet       Sig: Take 1 tablet by mouth 3 (Three) Times a Day As Needed. for [facility]        Pharmacy where request should be sent: [facility] #[id number] - [place] [address] [facility] [address] - [phone]  - [phone] [place]     Additional details provided by patient: PATIENT STATES HE NEEDS THIS MEDICATION SENT TO [facility] IN [place] INSTEAD OF [place]     Does the patient have less than a 3 day supply:  [x] Yes  [] No    Cee Arceo Rep   02/02/22 09:23 EST

## 2022-05-19 ENCOUNTER — DOCUMENTATION (OUTPATIENT)
Dept: PHYSICAL THERAPY | Facility: CLINIC | Age: 27
End: 2022-05-19

## 2022-05-19 NOTE — PROGRESS NOTES
Discharge Summary  Discharge Summary from Physical Therapy Report      Dates  PT visit: 9/28/22 - 10/21/22  Number of Visits: 5       Goals: Partially Met    Discharge Plan: Continue with current home exercise program as instructed    Comments Pt did not return for follow up appts following the 5th visit.    Date of Discharge 5/19/22        Adalberto Reyes PT  Physical Therapist    Electronically signed 5/19/2022    [place] License: PT - [id number]

## 2022-05-23 RX ORDER — CYCLOBENZAPRINE HCL 5 MG
TABLET ORAL
Qty: 15 TABLET | Refills: 0 | Status: SHIPPED | OUTPATIENT
Start: 2022-05-23

## 2022-07-12 RX ORDER — ZOLPIDEM TARTRATE 10 MG/1
10 TABLET ORAL NIGHTLY PRN
Qty: 30 TABLET | Refills: 5 | OUTPATIENT
Start: 2022-07-12

## 2022-07-12 NOTE — TELEPHONE ENCOUNTER
Caller: Jori Chavez Jose    Relationship: Self    Best call back number: [phone]     Requested Prescriptions:   Requested Prescriptions     Pending Prescriptions Disp Refills   • zolpidem (AMBIEN) 10 MG tablet 30 tablet 5     Sig: Take 1 tablet by mouth At Night As Needed for Sleep.        Pharmacy where request should be sent: [address] AT [address] & [address] [facility] [phone] [facility] [phone]      Additional details provided by patient: COMPLETELY OUT    PATIENT HAS MOVED TO [place], NO LONGER LIVES IN [place]    Does the patient have less than a 3 day supply:  [x] Yes  [] No    Cee PERALES Rep   07/12/22 12:49 EDT

## 2022-08-01 ENCOUNTER — OFFICE VISIT (OUTPATIENT)
Dept: FAMILY MEDICINE CLINIC | Facility: CLINIC | Age: 27
End: 2022-08-01

## 2022-08-01 VITALS
TEMPERATURE: 99.7 F | SYSTOLIC BLOOD PRESSURE: 150 MMHG | WEIGHT: 237 LBS | HEART RATE: 73 BPM | OXYGEN SATURATION: 99 % | HEIGHT: 71 IN | BODY MASS INDEX: 33.18 KG/M2 | DIASTOLIC BLOOD PRESSURE: 87 MMHG

## 2022-08-01 DIAGNOSIS — F32.1 CURRENT MODERATE EPISODE OF MAJOR DEPRESSIVE DISORDER WITHOUT PRIOR EPISODE: Primary | ICD-10-CM

## 2022-08-01 PROCEDURE — 99213 OFFICE O/P EST LOW 20 MIN: CPT | Performed by: FAMILY MEDICINE

## 2022-08-01 RX ORDER — ESCITALOPRAM OXALATE 10 MG/1
10 TABLET ORAL DAILY
Qty: 90 TABLET | Refills: 0 | Status: SHIPPED | OUTPATIENT
Start: 2022-08-01 | End: 2023-02-13 | Stop reason: SDUPTHER

## 2022-08-01 RX ORDER — ZOLPIDEM TARTRATE 10 MG/1
10 TABLET ORAL NIGHTLY PRN
Qty: 30 TABLET | Refills: 5 | Status: SHIPPED | OUTPATIENT
Start: 2022-08-01 | End: 2022-09-07 | Stop reason: SDUPTHER

## 2022-08-01 RX ORDER — ESCITALOPRAM OXALATE 10 MG/1
10 TABLET ORAL DAILY
Qty: 90 TABLET | Refills: 0 | Status: SHIPPED | OUTPATIENT
Start: 2022-08-01 | End: 2022-08-01

## 2022-08-01 NOTE — ASSESSMENT & PLAN NOTE
With his persistent ongoing symptoms will start him on some medication.  He also desires to see a therapist.

## 2022-08-01 NOTE — PROGRESS NOTES
Chief Complaint   Patient presents with   • Anxiety   • Depression     Pt would like to discuss anxiety and depression. Pt states she would also like to have refills on medications.        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    Depression/anxiety- he states in the last 4 to 5 months he has felt depressed and anxious.  He states is gotten to be rather problematic.  He has never taken anything in the past.  He states he had some suicidal ideations but no plan or actions.      PHQ-2 Depression Screening  Little interest or pleasure in doing things?     Feeling down, depressed, or hopeless?     PHQ-2 Total Score     PHQ-9 Depression Screening  Little interest or pleasure in doing things?     Feeling down, depressed, or hopeless?     Trouble falling or staying asleep, or sleeping too much?     Feeling tired or having little energy?     Poor appetite or overeating?     Feeling bad about yourself - or that you are a failure or have let yourself or your family down?     Trouble concentrating on things, such as reading the newspaper or watching television?     Moving or speaking so slowly that other people could have noticed? Or the opposite - being so fidgety or restless that you have been moving around a lot more than usual?     Thoughts that you would be better off dead, or of hurting yourself in some way?     PHQ-9 Total Score     If you checked off any problems, how difficult have these problems made it for you to do your work, take care of things at home, or get along with other people?       No Known Allergies    Prior to Admission medications    Medication Sig Start Date End Date Taking? Authorizing Provider   cyclobenzaprine (FLEXERIL) 5 MG tablet TAKE 1 TABLET BY MOUTH THREE TIMES DAILY AS NEEDED  FOR MUSCLE SPASMS 22  Yes Ko Shah DO   naproxen (NAPROSYN) 500 MG tablet Take 500 mg by mouth 2 (Two) Times a Day With Meals. 10/20/21  Yes Provider, MD Jack   zolpidem (AMBIEN) 10 MG tablet Take 1 tablet by mouth At Night As Needed for Sleep. 22  Yes Ko Shah DO        Patient Active Problem List   Diagnosis   • Sleep disorder, circadian   • Strain of thoracic spine, initial encounter   • Concussion   • Sprain and strain of cruciate ligament of knee   • Sprain and strain of medial collateral ligament of knee   • Syncope and collapse   • Current moderate episode of major depressive disorder without prior episode (HCC)        Past Surgical History:   Procedure Laterality Date   • KNEE ARTHROSCOPY Left     15 and 16  ACL recons       Social History     Socioeconomic History   • Marital status: Single   Tobacco Use   • Smoking status: Former Smoker     Packs/day: 1.00     Types: Cigarettes     Quit date: 2017     Years since quittin.5   • Smokeless tobacco: Never Used   • Tobacco comment: current some day - vape   Vaping Use   • Vaping Use: Every day   • Substances: Nicotine, Flavoring   • Devices: Refillable tank   Substance and Sexual Activity   • Alcohol use: Yes     Comment: current some day -19   • Drug use: Never   • Sexual activity: Defer       Family History   Problem Relation Age of Onset   • Hypertension Maternal Grandmother    • Diabetes Maternal Grandmother    • Hypertension Maternal Grandfather    • Diabetes Maternal Grandfather        Family history, surgical history, past medical history, Allergies and meds reviewed with patient today and updated in [employer] EMR.     ROS:  Review of Systems   Constitutional: Negative for fatigue.   Psychiatric/Behavioral: Positive for depressed mood. Negative for sleep disturbance and suicidal ideas. The patient is nervous/anxious.        OBJECTIVE:  Vitals:    22 1520   BP: 150/87   BP Location:  "Left arm   Patient Position: Sitting   Cuff Size: Large Adult   Pulse: 73   Temp: 99.7 °F (37.6 °C)   TempSrc: Temporal   SpO2: 99%   Weight: 108 kg (237 lb)   Height: 180.3 cm (71\")     No exam data present   Body mass index is 33.05 kg/m².  No LMP for male patient.    Physical Exam  Vitals and nursing note reviewed.   Constitutional:       General: He is not in acute distress.     Appearance: Normal appearance. He is normal weight.   HENT:      Head: Normocephalic.   Cardiovascular:      Rate and Rhythm: Normal rate and regular rhythm.      Heart sounds: Normal heart sounds. No murmur heard.  Pulmonary:      Effort: Pulmonary effort is normal.      Breath sounds: Normal breath sounds. No wheezing, rhonchi or rales.   Neurological:      Mental Status: He is alert.         Procedures    No visits with results within 30 Day(s) from this visit.   Latest known visit with results is:   Office Visit on 07/22/2021   Component Date Value Ref Range Status   • Amphet/Methamphet, Screen 07/22/2021 Positive (A) Negative Final   • Barbiturates Screen, Urine 07/22/2021 Negative  Negative Final   • Benzodiazepine Screen, Urine 07/22/2021 Negative  Negative Final   • Cocaine Screen, Urine 07/22/2021 Negative  Negative Final   • Opiate Screen 07/22/2021 Negative  Negative Final   • THC, Screen, Urine 07/22/2021 Positive (A) Negative Final   • Methadone Screen, Urine 07/22/2021 Negative  Negative Final   • Oxycodone Screen, Urine 07/22/2021 Positive (A) Negative Final       ASSESSMENT/ PLAN:    Diagnoses and all orders for this visit:    1. Current moderate episode of major depressive disorder without prior episode (HCC) (Primary)  Assessment & Plan:  With his persistent ongoing symptoms will start him on some medication.  He also desires to see a therapist.      Other orders  -     Discontinue: escitalopram (Lexapro) 10 MG tablet; Take 1 tablet by mouth Daily.  Dispense: 90 tablet; Refill: 0  -     escitalopram (Lexapro) 10 MG " tablet; Take 1 tablet by mouth Daily.  Dispense: 90 tablet; Refill: 0  -     zolpidem (AMBIEN) 10 MG tablet; Take 1 tablet by mouth At Night As Needed for Sleep.  Dispense: 30 tablet; Refill: 5      Orders Placed Today:     New Medications Ordered This Visit   Medications   • escitalopram (Lexapro) 10 MG tablet     Sig: Take 1 tablet by mouth Daily.     Dispense:  90 tablet     Refill:  0   • zolpidem (AMBIEN) 10 MG tablet     Sig: Take 1 tablet by mouth At Night As Needed for Sleep.     Dispense:  30 tablet     Refill:  5        Management Plan:     An After Visit Summary was printed and given to the patient at discharge.    Follow-up: Return in about 6 weeks (around 9/12/2022) for Recheck.    Ko Shah DO 8/1/2022 16:10 EDT  This note was electronically signed.

## 2022-09-07 ENCOUNTER — TELEPHONE (OUTPATIENT)
Dept: FAMILY MEDICINE CLINIC | Facility: CLINIC | Age: 27
End: 2022-09-07

## 2022-09-07 RX ORDER — ZOLPIDEM TARTRATE 10 MG/1
10 TABLET ORAL NIGHTLY PRN
Qty: 30 TABLET | Refills: 0 | Status: SHIPPED | OUTPATIENT
Start: 2022-09-07 | End: 2022-10-10 | Stop reason: SDUPTHER

## 2022-09-07 NOTE — TELEPHONE ENCOUNTER
Caller: Jori Chavez Jose    Relationship: Self    Best call back number: [phone]    Requested Prescriptions:   Requested Prescriptions     Pending Prescriptions Disp Refills   • zolpidem (AMBIEN) 10 MG tablet 30 tablet 5     Sig: Take 1 tablet by mouth At Night As Needed for Sleep.        Pharmacy where request should be sent: [facility] #[id number] [facility] [id number] [place] [address] AT [facility] & [facility] [phone] [facility] [phone]      Additional details provided by patient: PATIENT TOOK LAST DOSAGE 9.5.22.    Does the patient have less than a 3 day supply:  [x] Yes  [] No    Cee Sanchez Rep   09/07/22 14:26 EDT

## 2022-10-10 ENCOUNTER — TELEPHONE (OUTPATIENT)
Dept: FAMILY MEDICINE CLINIC | Facility: CLINIC | Age: 27
End: 2022-10-10

## 2022-10-10 RX ORDER — ZOLPIDEM TARTRATE 10 MG/1
10 TABLET ORAL NIGHTLY PRN
Qty: 30 TABLET | Refills: 0 | Status: SHIPPED | OUTPATIENT
Start: 2022-10-10

## 2022-10-10 NOTE — TELEPHONE ENCOUNTER
Caller: Jori Chavez Jose    Relationship: Self    Best call back number: [phone]    Requested Prescriptions:   Requested Prescriptions     Pending Prescriptions Disp Refills   • zolpidem (AMBIEN) 10 MG tablet 30 tablet 0     Sig: Take 1 tablet by mouth At Night As Needed for Sleep.        Pharmacy where request should be sent: [facility] #[id number] [facility] [id number] [place] [address] AT [facility] [phone] [facility] [phone]      Additional details provided by patient: PATIENT TOOK LAST DOSE ON 10.9.22     Does the patient have less than a 3 day supply:  [x] Yes  [] No    Cee Sanchez Rep   10/10/22 15:14 EDT

## 2023-02-13 RX ORDER — ESCITALOPRAM OXALATE 10 MG/1
10 TABLET ORAL DAILY
Qty: 90 TABLET | Refills: 0 | Status: SHIPPED | OUTPATIENT
Start: 2023-02-13

## 2023-04-18 RX ORDER — ESCITALOPRAM OXALATE 10 MG/1
10 TABLET ORAL DAILY
Qty: 30 TABLET | Refills: 0 | Status: SHIPPED | OUTPATIENT
Start: 2023-04-18

## 2023-04-18 RX ORDER — ZOLPIDEM TARTRATE 10 MG/1
10 TABLET ORAL NIGHTLY PRN
Qty: 30 TABLET | Refills: 0 | OUTPATIENT
Start: 2023-04-18

## 2023-04-18 NOTE — TELEPHONE ENCOUNTER
Caller: Jose Starr    Relationship: Self    Best call back number: [phone]     Requested Prescriptions:   Requested Prescriptions     Pending Prescriptions Disp Refills   • zolpidem (AMBIEN) 10 MG tablet 30 tablet 0     Sig: Take 1 tablet by mouth At Night As Needed for Sleep.   • escitalopram (Lexapro) 10 MG tablet 90 tablet 0     Sig: Take 1 tablet by mouth Daily.        Pharmacy where request should be sent: [email or web address] #[id number] - [place] [address] AT [facility] & [address] - [phone]  - [phone] FX     Last office visit with prescribing clinician: 2022   Last telemedicine visit with prescribing clinician: Visit date not found   Next office visit with prescribing clinician: Visit date not found     Additional details provided by patient: WILL BE GOING OUT OF TOWN 23 FOR A     PATIENT STATES PLEASE CALL TWICE. HE CAN'T ANSWER IF HE IS NOT ON THE PHONE.    Does the patient have less than a 3 day supply:  [x] Yes  [] No    Would you like a call back once the refill request has been completed: [x] Yes [] No    If the office needs to give you a call back, can they leave a voicemail: [x] Yes [] No    Cee Bruno Rep   23 10:43 EDT

## 2023-11-20 ENCOUNTER — OFFICE VISIT (OUTPATIENT)
Dept: FAMILY MEDICINE CLINIC | Facility: CLINIC | Age: 28
End: 2023-11-20
Payer: COMMERCIAL

## 2023-11-20 VITALS
OXYGEN SATURATION: 98 % | TEMPERATURE: 98.4 F | HEIGHT: 71 IN | HEART RATE: 106 BPM | SYSTOLIC BLOOD PRESSURE: 154 MMHG | BODY MASS INDEX: 36.12 KG/M2 | DIASTOLIC BLOOD PRESSURE: 92 MMHG | WEIGHT: 258 LBS

## 2023-11-20 DIAGNOSIS — F32.1 CURRENT MODERATE EPISODE OF MAJOR DEPRESSIVE DISORDER WITHOUT PRIOR EPISODE: Primary | ICD-10-CM

## 2023-11-20 DIAGNOSIS — G47.20 SLEEP DISORDER, CIRCADIAN: ICD-10-CM

## 2023-11-20 DIAGNOSIS — F11.10 OPIOID ABUSE: ICD-10-CM

## 2023-11-20 PROCEDURE — 99214 OFFICE O/P EST MOD 30 MIN: CPT | Performed by: FAMILY MEDICINE

## 2023-11-20 RX ORDER — SERTRALINE HYDROCHLORIDE 25 MG/1
25 TABLET, FILM COATED ORAL DAILY
Qty: 30 TABLET | Refills: 2 | Status: SHIPPED | OUTPATIENT
Start: 2023-11-20

## 2023-11-20 RX ORDER — SERTRALINE HYDROCHLORIDE 25 MG/1
25 TABLET, FILM COATED ORAL DAILY
COMMUNITY
End: 2023-11-20 | Stop reason: SDUPTHER

## 2023-11-20 RX ORDER — DARIDOREXANT 50 MG/1
50 TABLET, FILM COATED ORAL NIGHTLY
Qty: 30 TABLET | Refills: 2 | Status: SHIPPED | OUTPATIENT
Start: 2023-11-20
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
## 2023-11-20 NOTE — ASSESSMENT & PLAN NOTE
Currently he is going to an outpatient treatment 5 days/week.  He states he is to continue that program for at least 3 months before he is able to titrated down to less often.

## 2023-11-20 NOTE — ASSESSMENT & PLAN NOTE
With his history of an abuse disorder we will stay away from the zolpidem.  We will try the Quviviq.

## 2023-11-20 NOTE — ASSESSMENT & PLAN NOTE
He states he is doing better with the sertraline at this time.  They did a gene inside test on him and found that the Lexapro had poor metabolism and the sertraline was better.  Since making that change he felt that he has done better.  We will continue that at this time.

## 2023-11-29 ENCOUNTER — TELEPHONE (OUTPATIENT)
Dept: FAMILY MEDICINE CLINIC | Facility: CLINIC | Age: 28
End: 2023-11-29

## 2023-11-29 NOTE — TELEPHONE ENCOUNTER
Caller: Jose Starr    Relationship to patient: Self    Best call back number: [phone] PATIENT IS REQUESTING A CALL BACK WITH STATUS    Patient is needing: PATIENT STATED THAT PHARMACY STATED THAT THE QUVIVIQ IS REQUIRING MEDICATION PRIOR AUTHORIZATION  AND HE IS WANTING TO KNOW IF THIS CAN NOT BE DONE TO HAVE HIM PUT BACK ON GENERIC AMBIEN.   HE HAS HAD NO SLEEPING MEDICATION OF ANY KIND FOR 8 DAYS.     [id number] [place] - [address]  [phone] [facility] [phone] FX

## 2023-11-29 NOTE — TELEPHONE ENCOUNTER
Patients Quviviq was denied. Patient is asking if he can take zolpidem instead. Zolpidem is on the approved formulary.

## 2023-11-29 NOTE — TELEPHONE ENCOUNTER
I am unable to do this PA because patient did not bring his insurance card in. Patient is going to get the numbers off of the insurance card and give the office a call back

## 2023-11-30 RX ORDER — TRAZODONE HYDROCHLORIDE 50 MG/1
50 TABLET ORAL NIGHTLY
Qty: 30 TABLET | Refills: 1 | Status: SHIPPED | OUTPATIENT
Start: 2023-11-30

## 2023-11-30 NOTE — TELEPHONE ENCOUNTER
Patient stated this typically does not work the best but he will try it. [facility] to [facility] [place]

## 2024-01-31 ENCOUNTER — TELEPHONE (OUTPATIENT)
Dept: FAMILY MEDICINE CLINIC | Facility: CLINIC | Age: 29
End: 2024-01-31
Payer: COMMERCIAL

## 2024-01-31 NOTE — TELEPHONE ENCOUNTER
Caller: Jori Chavez Jose    Relationship: Self    Best call back number: [phone]    What form or medical record are you requesting: PAPERWORK STATING PATIENT IS NO LONGER TAKING ANY SLEEPING MEDICATION     Who is requesting this form or medical record from you: [employer]     How would you like to receive the form or medical records (pick-up, mail, fax): FAX: [phone]    Timeframe paperwork needed: ASAP     Additional notes: PATIENT STATES HIS [employer] IS NEEDING TO KNOW HE IS NO LONGER TAKING ANY SLEEPING MEDICATION.

## 2024-07-26 ENCOUNTER — OFFICE VISIT (OUTPATIENT)
Dept: FAMILY MEDICINE CLINIC | Facility: CLINIC | Age: 29
End: 2024-07-26
Payer: COMMERCIAL

## 2024-07-26 VITALS
HEIGHT: 71 IN | TEMPERATURE: 98.5 F | WEIGHT: 275 LBS | SYSTOLIC BLOOD PRESSURE: 157 MMHG | DIASTOLIC BLOOD PRESSURE: 83 MMHG | BODY MASS INDEX: 38.5 KG/M2 | HEART RATE: 76 BPM | OXYGEN SATURATION: 97 %

## 2024-07-26 DIAGNOSIS — F32.1 CURRENT MODERATE EPISODE OF MAJOR DEPRESSIVE DISORDER WITHOUT PRIOR EPISODE: ICD-10-CM

## 2024-07-26 DIAGNOSIS — F51.04 CHRONIC INSOMNIA: ICD-10-CM

## 2024-07-26 DIAGNOSIS — F11.10 OPIOID ABUSE: Primary | ICD-10-CM

## 2024-07-26 PROCEDURE — 99213 OFFICE O/P EST LOW 20 MIN: CPT | Performed by: FAMILY MEDICINE

## 2024-07-26 RX ORDER — ZOLPIDEM TARTRATE 10 MG/1
10 TABLET ORAL NIGHTLY PRN
Qty: 30 TABLET | Refills: 2 | Status: SHIPPED | OUTPATIENT
Start: 2024-07-26

## 2024-07-26 NOTE — ASSESSMENT & PLAN NOTE
He has now been sober for 286 days.  Encouraged him to attend his AA/NA meetings on a regular basis for long-term sobriety.

## 2024-07-26 NOTE — ASSESSMENT & PLAN NOTE
I am apprehensive to give him any controlled substances due to his history of opioid abuse.  His urine drug screen today though was negative.  He has tried noncontrolled substances without any success and other noncontrolled sleep aids are not covered by his insurance and extremely expensive.  Will do some zolpidem short-term with short-term follow-up.

## 2024-07-26 NOTE — PROGRESS NOTES
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    Opioid abuse-he has a long history of opioid abuse marijuana and cocaine use.  He did a 30-day inpatient rehab.  He states as of today he has been sober now for 286 days.  Currently he attends AA meetings 1-2 times per week.  Currently he has chosen not to do any other medical assisted treatment for this.    Insomnia-he has chronic insomnia.  He states after several days of inadequate sleep he becomes somewhat emotional and has some cognitive difficulties.  He states he has tried multiple over-the-counter medications.  He had been using the trazodone we had given him last visit without any success.  He states when he was in rehab they tried Seroquel and that was ineffective as well.  Historically he had been on zolpidem and that was effective.        PHQ-2 Depression Screening  Little interest or pleasure in doing things?     Feeling down, depressed, or hopeless?     PHQ-2 Total Score     PHQ-9 Depression Screening  Little interest or pleasure in doing things?     Feeling down, depressed, or hopeless?     Trouble falling or staying asleep, or sleeping too much?     Feeling tired or having little energy?     Poor appetite or overeating?     Feeling bad about yourself - or that you are a failure or have let yourself or your family down?     Trouble concentrating on things, such as reading the newspaper or watching television?     Moving or speaking so slowly that other people could have noticed? Or the opposite - being so fidgety or restless that you have been moving around a lot more than usual?     Thoughts that you would be better off dead, or of hurting yourself in some way?     PHQ-9 Total Score      If you checked off any problems, how difficult have these problems made it for you to do your work, take care of things at home, or get along with other people?       No Known Allergies    Prior to Admission medications    Medication Sig Start Date End Date Taking? Authorizing Provider   traZODone (DESYREL) 50 MG tablet Take 1 tablet by mouth Every Night.  Patient not taking: Reported on 23   Ko Shah DO   sertraline (ZOLOFT) 25 MG tablet Take 1 tablet by mouth Daily.  Patient not taking: Reported on 23  Ko Shah DO        Patient Active Problem List   Diagnosis    Sleep disorder, circadian    Strain of thoracic spine, initial encounter    Concussion    Sprain and strain of cruciate ligament of knee    Sprain and strain of medial collateral ligament of knee    Syncope and collapse    Current moderate episode of major depressive disorder without prior episode    Opioid abuse    Chronic insomnia        Past Surgical History:   Procedure Laterality Date    KNEE ARTHROSCOPY Left     15 and 16  ACL recons       Social History     Socioeconomic History    Marital status: Single   Tobacco Use    Smoking status: Former     Current packs/day: 0.00     Types: Cigarettes     Quit date:      Years since quittin.5    Smokeless tobacco: Never    Tobacco comments:     current some day - vape   Vaping Use    Vaping status: Every Day    Substances: Nicotine, Flavoring    Devices: Refillable tank   Substance and Sexual Activity    Alcohol use: Yes     Comment: current some day -21    Drug use: Never    Sexual activity: Defer       Family History   Problem Relation Age of Onset    Hypertension Maternal Grandmother     Diabetes Maternal Grandmother     Hypertension Maternal Grandfather     Diabetes Maternal Grandfather        Family history, surgical history, past medical history, Allergies and meds reviewed with patient today and updated  "in [employer] EMR.     ROS:  Review of Systems   Constitutional:  Negative for fatigue.   Psychiatric/Behavioral:  Positive for sleep disturbance and depressed mood. Negative for self-injury and suicidal ideas. The patient is nervous/anxious.        OBJECTIVE:  Vitals:    07/26/24 1102   BP: 157/83   BP Location: Left arm   Patient Position: Sitting   Pulse: 76   Temp: 98.5 °F (36.9 °C)   SpO2: 97%   Weight: 125 kg (275 lb)   Height: 180.3 cm (71\")     No results found.   Body mass index is 38.35 kg/m².  No LMP for male patient.    The ASCVD Risk score (Brodie DK, et al., 2019) failed to calculate for the following reasons:    The 2019 ASCVD risk score is only valid for ages 40 to 79     Physical Exam  Vitals and nursing note reviewed.   Constitutional:       General: He is not in acute distress.     Appearance: Normal appearance. He is obese.   HENT:      Head: Normocephalic.   Cardiovascular:      Rate and Rhythm: Normal rate and regular rhythm.      Heart sounds: Normal heart sounds. No murmur heard.  Pulmonary:      Effort: Pulmonary effort is normal.      Breath sounds: Normal breath sounds. No wheezing or rhonchi.   Neurological:      Mental Status: He is alert and oriented to person, place, and time.   Psychiatric:         Attention and Perception: Attention normal.         Mood and Affect: Mood is anxious.         Speech: Speech normal.         Behavior: Behavior normal.         Thought Content: Thought content normal.         Cognition and Memory: Cognition normal.         Judgment: Judgment normal.         Procedures    No visits with results within 30 Day(s) from this visit.   Latest known visit with results is:   Office Visit on 07/22/2021   Component Date Value Ref Range Status    Amphet/Methamphet, Screen 07/22/2021 Positive (A)  Negative Final    Barbiturates Screen, Urine 07/22/2021 Negative  Negative Final    Benzodiazepine Screen, Urine 07/22/2021 Negative  Negative Final    Cocaine Screen, Urine " 07/22/2021 Negative  Negative Final    Opiate Screen 07/22/2021 Negative  Negative Final    THC, Screen, Urine 07/22/2021 Positive (A)  Negative Final    Methadone Screen, Urine 07/22/2021 Negative  Negative Final    Oxycodone Screen, Urine 07/22/2021 Positive (A)  Negative Final       ASSESSMENT/ PLAN:    Diagnoses and all orders for this visit:    1. Opioid abuse (Primary)  Assessment & Plan:  He has now been sober for 286 days.  Encouraged him to attend his AA/NA meetings on a regular basis for long-term sobriety.      2. Current moderate episode of major depressive disorder without prior episode  Assessment & Plan:  Will have him see psych for further evaluation regarding his mental health.    Orders:  -     Ambulatory Referral to Psychiatry    3. Chronic insomnia  Assessment & Plan:  I am apprehensive to give him any controlled substances due to his history of opioid abuse.  His urine drug screen today though was negative.  He has tried noncontrolled substances without any success and other noncontrolled sleep aids are not covered by his insurance and extremely expensive.  Will do some zolpidem short-term with short-term follow-up.    Orders:  -     zolpidem (AMBIEN) 10 MG tablet; Take 1 tablet by mouth At Night As Needed for Sleep.  Dispense: 30 tablet; Refill: 2               Orders Placed Today:     New Medications Ordered This Visit   Medications    zolpidem (AMBIEN) 10 MG tablet     Sig: Take 1 tablet by mouth At Night As Needed for Sleep.     Dispense:  30 tablet     Refill:  2        Management Plan:     An After Visit Summary was printed and given to the patient at discharge.    Follow-up: Return in about 3 months (around 10/26/2024) for Recheck.    Ko Shah DO 7/26/2024 11:51 EDT  This note was electronically signed.